# Patient Record
Sex: MALE | Race: WHITE
[De-identification: names, ages, dates, MRNs, and addresses within clinical notes are randomized per-mention and may not be internally consistent; named-entity substitution may affect disease eponyms.]

---

## 2017-10-10 ENCOUNTER — HOSPITAL ENCOUNTER (INPATIENT)
Dept: HOSPITAL 74 - YASAS | Age: 42
LOS: 7 days | Discharge: HOME | DRG: 772 | End: 2017-10-17
Attending: PSYCHIATRY & NEUROLOGY | Admitting: PSYCHIATRY & NEUROLOGY
Payer: COMMERCIAL

## 2017-10-10 DIAGNOSIS — F14.20: Primary | ICD-10-CM

## 2017-10-10 DIAGNOSIS — F19.24: ICD-10-CM

## 2017-10-10 DIAGNOSIS — F17.210: ICD-10-CM

## 2017-10-10 DIAGNOSIS — Z98.890: ICD-10-CM

## 2017-10-10 DIAGNOSIS — F19.282: ICD-10-CM

## 2017-10-10 LAB
APPEARANCE UR: CLEAR
BILIRUB UR STRIP.AUTO-MCNC: NEGATIVE MG/DL
COLOR UR: YELLOW
KETONES UR QL STRIP: NEGATIVE
NITRITE UR QL STRIP: NEGATIVE
PH UR: 5 [PH] (ref 5–8)
PROT UR QL STRIP: NEGATIVE
PROT UR QL STRIP: NEGATIVE
RBC # UR STRIP: NEGATIVE /UL
UROBILINOGEN UR STRIP-MCNC: NEGATIVE MG/DL (ref 0.2–1)

## 2017-10-10 PROCEDURE — G0008 ADMIN INFLUENZA VIRUS VAC: HCPCS

## 2017-10-10 PROCEDURE — HZ42ZZZ GROUP COUNSELING FOR SUBSTANCE ABUSE TREATMENT, COGNITIVE-BEHAVIORAL: ICD-10-PCS | Performed by: PSYCHIATRY & NEUROLOGY

## 2017-10-10 PROCEDURE — G0009 ADMIN PNEUMOCOCCAL VACCINE: HCPCS

## 2017-10-10 RX ADMIN — Medication SCH: at 22:35

## 2017-10-10 NOTE — HP
Admission [facility]





- Hospitals in [place]


Chief Complaint: 


I AM HERE [facility] FROM COCAINE


Allergies/Adverse Reactions: 


 Allergies











Allergy/AdvReac Type Severity Reaction Status Date / Time


 


No Known Allergies Allergy   Verified 10/10/17 14:44











History of Present Illness: 


THIS 42 YEARS OLD MALE WITH COCAINE DEPENDENCE FOR REHAB


PATIENT LAST TREATMENT IN [facility] 10 DAYS AGO NOT COMPLETED


NO SIGNIFICANT PERIOD OF SOBRIETY


Exam Limitations: No Limitations





- Ebola screening


Have you traveled outside of the country in the last 21 days: No


Have you had contact with anyone from an Ebola affected area: No


Have you been sick,other than usual withdrawal symptoms: No


Do you have a fever: No





- Review of Systems


Constitutional: No Symptoms Reported


EENT: reports: No Symptoms Reported


Respiratory: reports: No Symptoms reported


Cardiac: reports: No Symptoms Reported


GI: reports: No Symptoms Reported


: reports: No Symptoms Reported


Musculoskeletal: reports: No Symptoms Reported


Integumentary: reports: No Symptoms Reported


Neuro: reports: No Symptoms reported


Endocrine: reports: No Symptoms Reported


Hematology: reports: No Symptoms Reported


Psychiatric: reports: No Sypmtoms Reported





Patient History





- Patient Medical History


Hx Anemia: No


Hx Asthma: No


Hx Chronic Obstructive Pulmonary Disease (COPD): No


Hx Cancer: No


Hx Cardiac Disorders: No


Hx Congestive Heart Failure: No


Hx Hypertension: No


Hx Hypercholesterolemia: No


Hx Pacemaker: No


HX Cerebrovascular Accident: No


Hx Seizures: No


Hx Dementia: No


Hx Diabetes: No


Hx Gastrointestinal Disorders: No


Hx Liver Disease: No


Hx Genitourinary Disorders: No


Hx Sexually Transmitted Disorders: No


Hx Renal Disease (ESRD): No


Hx Thyroid Disease: No


Hx Human Immunodeficiency Virus (HIV): No (LAST 2016)


Hx Hepatitis C: No


Hx Depression: No


Hx Suicide Attempt: No


Hx Bipolar Disorder: No


Hx Schizophrenia: No


Other Medical History: NO SUICIDAL,NO HOMICIDAL





- Patient Surgical History


Past Surgical History: Yes


Other Surgical History: REMOVAL OF LIPOMA LEFT SCAPULAR AREA X 2 





- PPD History


Previous Implant?: Yes


Documented Results: Negative w/o proof


PPD to be Administered?: Yes





- Smoking Cessation


Smoking history: Current every day smoker


Have you smoked in the past 12 months: Yes


Hx Chewing Tobacco Use: No


Initiated information on smoking cessation: Yes


'Breaking Loose' booklet given: 10/10/17





- Substance & Tx. History


Hx Alcohol Use: No


Hx Substance Use: Yes


Substance Use Type: Cocaine


Hx Substance Use Treatment: Yes ([facility] 10 DAYS AGO NOT COMPLETED)





- Substances Abused


  ** Cocaine


Route: Inhalation


Frequency: 3-6 times per week


Amount used: $100


Age of first use: 18


Date of Last Use: 10/08/17





Family Disease History





- Family Disease History


Family History: Denies





Admission Physical Exam [facility]





- Vital Signs


Vital Signs: 


 Vital Signs - 24 hr











  10/10/17





  10:03


 


Temperature 98.8 F


 


Pulse Rate 74


 


Respiratory 18





Rate 


 


Blood Pressure 144/90














- Physical


General Appearance: Yes: Within Normal Limits, No Apparent Distress


HEENTM: Yes: Normal ENT Inspection, Normocephalic, TESS


Respiratory: Yes: Lungs Clear, Normal Breath Sounds, No Respiratory Distress


Neck: Yes: Within Normal Limits, Supple, Trachea in good position


Breast: Yes: Within Normal Limits


Cardiology: Yes: Within Normal Limits, Regular Rate, S1, S2


Abdominal: Yes: Within Normal Limits, Non Tender, Soft


Genitourinary: Yes: Within Normal Limits


Back: Yes: Within Normal Limits


Musculoskeletal: Yes: Within Normal Limits, full range of Motion


Extremities: Yes: Within Normal Limits, Normal Range of Motion


Neurological: Yes: CNs II-XII NML intact, Alert, Motor Strength 5/5


Integumentary: Yes: Within Normal Limits


Lymphatic: Yes: Within Normal Limits





- Diagnostic


(1) Cocaine dependence


Current Visit: Yes   Status: Acute   





(2) Nicotine dependence


Current Visit: Yes   Status: Acute   





(3) Status post excision of lipoma


Current Visit: Yes   Status: Acute








Cleared for Admission [facility]





- Detox or Rehab


Claeared for Rehab Admission: Yes





[facility] Breath Alcohol Content


Breath Alcohol Content: 0





Urine Drug Screen





- Results


Drug Screen Negative: No


Urine Drug Screen Results: MARY KATE-Cocaine





Inpatient Rehab Admission





- Initial Determination


Are CD services needed?: Yes


Free of communicable disease: Yes


Not in need of hospitalization: Yes





- Rehab Admission Criteria


Poor recovery environment: Yes


Patient is meeting Inpatient Rehab admission criteria:: Yes

## 2017-10-11 LAB
ALBUMIN SERPL-MCNC: 3.9 G/DL (ref 3.4–5)
ALP SERPL-CCNC: 114 U/L (ref 45–117)
ALT SERPL-CCNC: 27 U/L (ref 12–78)
ANION GAP SERPL CALC-SCNC: 7 MMOL/L (ref 8–16)
AST SERPL-CCNC: 19 U/L (ref 15–37)
BILIRUB SERPL-MCNC: 0.4 MG/DL (ref 0.2–1)
CALCIUM SERPL-MCNC: 9 MG/DL (ref 8.5–10.1)
CO2 SERPL-SCNC: 29 MMOL/L (ref 21–32)
CREAT SERPL-MCNC: 1.1 MG/DL (ref 0.7–1.3)
DEPRECATED RDW RBC AUTO: 14 % (ref 11.9–15.9)
GLUCOSE SERPL-MCNC: 83 MG/DL (ref 74–106)
HIV 1 & 2 AB: NEGATIVE
HIV 1 AGP24: NEGATIVE
MCH RBC QN AUTO: 29 PG (ref 25.7–33.7)
MCHC RBC AUTO-ENTMCNC: 33 G/DL (ref 32–35.9)
MCV RBC: 87.6 FL (ref 80–96)
PLATELET # BLD AUTO: 264 K/MM3 (ref 134–434)
PMV BLD: 9.6 FL (ref 7.5–11.1)
PROT SERPL-MCNC: 7.4 G/DL (ref 6.4–8.2)
SP GR UR: >= 1.03 (ref 1–1.02)
WBC # BLD AUTO: 6.5 K/MM3 (ref 4–10)

## 2017-10-11 RX ADMIN — Medication SCH: at 10:27

## 2017-10-11 RX ADMIN — IBUPROFEN PRN MG: 400 TABLET, FILM COATED ORAL at 19:05

## 2017-10-11 RX ADMIN — NICOTINE SCH: 21 PATCH TRANSDERMAL at 10:27

## 2017-10-11 RX ADMIN — Medication SCH: at 23:05

## 2017-10-11 NOTE — HP
Psychiatrist Admission





- Data


Date of interview: 10/11/17


Admission source: Friend


Identifying data: This is the first [facility] 

admission for this 42 years old   male, father of 2 children, 

unemployed with no source of income, domiciled living with his wife and children


Medical History: Significant for history of kidney stones and surgery for 

excision of lipoma on left scapular area. Smokes cigarettes


Psychiatric History: Denies history of previous psychiatric treatment


Physical/Sexual Abuse/Trauma History: Denies history of verbal physical or 

sexual as well as DV relationship. No  service


Additional Comment: Reports history of 6 previous arrests including 3 felony 

convictions. Denies being on parole/probation at present


Vital Signs: 


 Vital Signs - 24 hr











  10/10/17 10/11/17 10/11/17





  10:03 00:30 03:30


 


Temperature 98.8 F  


 


Pulse Rate 74  


 


Respiratory 18 18 18





Rate   


 


Blood Pressure 144/90  











Allergies/Adverse Reactions: 


 Allergies











Allergy/AdvReac Type Severity Reaction Status Date / Time


 


No Known Allergies Allergy   Verified 10/10/17 14:44











Date of last physical exam: 10/10/17


Concur with the findings of this exam: Yes





- Substance Abuse/Tx History


Hx Alcohol Use: No


Hx Substance Use: Yes


Substance Use Type: Cocaine (Started using cocaine at age 18, consumes $100 

worth 3-6 times weekly. Last used on 10/8/17)


Hx Substance Use Treatment: Yes (3-4 prvious inpt rehab)





Mental Status Exam





- Mental Status Exam


Alert and Oriented to: Time, Place, Person


Cognitive Function: Fair


Patient Appearance: Well Groomed


Mood: Irritable


Affect: Appropriate


Speech Pattern: Clear


Voice Loudness: Normal


Thought Process: Intact


Hallucinations: Denies


Suicidal Ideation: Denies


Homicidal Ideation: Denies


Insight/Judgement: Fair


Sleep: Poorly


Appetite: Good


Muscle strength/Tone: Normal


Gait/Station: Normal





Psychiatric Findings





- Problem List (Axis 1, 2,3)


(1) Cocaine dependence


Current Visit: Yes   Status: Acute   





(2) Nicotine dependence


Current Visit: Yes   Status: Acute   





(3) Substance induced mood disorder


Current Visit: Yes   Status: Acute





(4) Substance-induced sleep disorder


Current Visit: Yes   Status: Acute





(5) Status post excision of lipoma


Current Visit: Yes   Status: Acute








- Initial Treatment Plan


Initial Treatment Plan: 1) Start Belsomra 10 mg po HS prn for insomnia.  2) 

Monitor progress

## 2017-10-11 NOTE — EKG
Test Reason : 

Blood Pressure : ***/*** mmHG

Vent. Rate : 076 BPM     Atrial Rate : 076 BPM

   P-R Int : 148 ms          QRS Dur : 098 ms

    QT Int : 406 ms       P-R-T Axes : 056 015 031 degrees

   QTc Int : 456 ms

 

NORMAL SINUS RHYTHM

RSR' OR QR PATTERN IN V1 SUGGESTS RIGHT VENTRICULAR CONDUCTION DELAY

BORDERLINE ECG

NO PREVIOUS ECGS AVAILABLE

Confirmed by BENTLEY CLEVELAND MD ([id number]) on 10/11/2017 10:43:26 AM

 

Referred By:             Confirmed By:BENTLEY CLEVELAND MD

## 2017-10-12 RX ADMIN — Medication SCH: at 10:45

## 2017-10-12 RX ADMIN — Medication SCH MG: at 21:50

## 2017-10-12 RX ADMIN — NICOTINE SCH: 21 PATCH TRANSDERMAL at 10:45

## 2017-10-13 RX ADMIN — Medication SCH MG: at 21:31

## 2017-10-13 RX ADMIN — NICOTINE SCH: 21 PATCH TRANSDERMAL at 10:21

## 2017-10-13 RX ADMIN — Medication SCH: at 10:21

## 2017-10-14 RX ADMIN — Medication SCH TAB: at 09:56

## 2017-10-14 RX ADMIN — NICOTINE SCH: 21 PATCH TRANSDERMAL at 09:57

## 2017-10-14 RX ADMIN — Medication SCH MG: at 21:43

## 2017-10-15 RX ADMIN — Medication SCH MG: at 22:03

## 2017-10-15 RX ADMIN — Medication SCH: at 10:23

## 2017-10-15 RX ADMIN — NICOTINE SCH: 21 PATCH TRANSDERMAL at 10:22

## 2017-10-16 RX ADMIN — Medication SCH: at 23:43

## 2017-10-16 RX ADMIN — IBUPROFEN PRN MG: 400 TABLET, FILM COATED ORAL at 19:42

## 2017-10-16 RX ADMIN — Medication SCH TAB: at 09:59

## 2017-10-16 RX ADMIN — NICOTINE SCH: 21 PATCH TRANSDERMAL at 10:00

## 2017-10-16 NOTE — PN
Psychiatric Progress Note


Vital Signs: 


 Vital Signs











 Period  Temp  Pulse  Resp  BP Sys/Thomason  Pulse Ox


 


 Last 24 Hr  97.3 F  66  16-18  141/80  











Date of Session: 10/16/17


Chief Complaint:: Discharge Note


HPI: Patient addressing Cocaine Dependence comorbid with Nicotine Dependence, 

Substance-Induced Mood Disorder and Substance-Induced Sleep Disorder


Current Medications: 


Active Medications











Generic Name Dose Route Start Last Admin





  Trade Name Freq  PRN Reason Stop Dose Admin


 


Acetaminophen  650 mg 10/10/17 16:18 10/12/17 19:56





  Tylenol -  PO   650 mg





  Q4H PRN   Administration





  PAIN   


 


Al Hydroxide/Mg Hydroxide  30 ml 10/10/17 16:18  





  Mylanta Oral Suspension -  PO   





  Q6H PRN   





  DYSPEPSIA   


 


Diphenhydramine HCl  50 mg 10/13/17 20:00 10/13/17 21:31





  Benadryl -  PO   50 mg





  HS PRN   Administration





  INSOMNIA   


 


Eucalyptus/Menthol/Phenol/Sorbitol  1 each 10/10/17 16:18  





  Cepastat Lozenge -  MM   





  Q4H PRN   





  SORE THROAT   


 


Guaifenesin  10 ml 10/10/17 16:18  





  Robitussin Dm -  PO   





  Q6H PRN   





  COUGH   


 


Hydroxyzine Pamoate  50 mg 10/10/17 16:18 10/14/17 21:44





  Vistaril -  PO   50 mg





  Q4H PRN   Administration





  AGITATION   


 


Ibuprofen  400 mg 10/10/17 16:18 10/11/17 19:05





  Motrin -  PO   400 mg





  Q6H PRN   Administration





  SEVERE PAIN   


 


Loperamide HCl  4 mg 10/10/17 16:18  





  Imodium -  PO   





  Q6H PRN   





  DIARRHEA   


 


Magnesium Citrate  300 ml 10/10/17 16:18  





  Citroma -  PO   





  Q48H PRN   





  CONSTIPATION   


 


Magnesium Hydroxide  30 ml 10/10/17 16:18  





  Milk Of Magnesia -  PO   





  DAILY PRN   





  CONSTIPATION   


 


Nicotine  21 mg 10/11/17 10:00 10/16/17 10:00





  Nicoderm Patch -  TD   Not Given





  DAILY JEIMY   


 


Prenatal Multivit/Folic Acid/Iron  1 tab 10/11/17 10:00 10/16/17 09:59





  Prenatal Vitamins (Sjr) -  PO   1 tab





  DAILY JEIMY   Administration


 


Pseudoephedrine/Triprolidine  1 combo 10/10/17 16:18  





  Actifed -  PO   





  TID PRN   





  NASAL CONGESTION   


 


Thiamine HCl  100 mg 10/10/17 22:00 10/15/17 22:03





  Vitamin B1 -  PO   100 mg





  HS JEIMY   Administration











Current Side Effect: No


Lab tests ordered: Yes


Lab tests reviewed: Yes


Provider note:: Patient will complete this program on 10/17/17. He has met his 

short term goals and will cintinue to address his issues in outpatient 

treatment at [facility] at [address] [place] [address]. Told 

writer that from his participation in this program, he has learned to be focus 

on himself and take things a day at a time. He is stable for discharge on 10/17/

17


Total face to face time:: 35





Mental Status Exam





- Mental Status Exam


Alert and Oriented to: Time, Place, Person


Cognitive Function: Fair


Patient Appearance: Well Groomed


Mood: Hopeful, Euthymic


Affect: Appropriate


Patient Behavior: Cooperative


Speech Pattern: Clear


Voice Loudness: Normal


Thought Process: Intact, Goal Oriented


Thought Disorder: Not Present


Hallucinations: Denies


Suicidal Ideation: Denies


Homicidal Ideation: Denies


Insight/Judgement: Fair


Sleep: Fair


Appetite: Good


Muscle strength/Tone: Normal


Gait/Station: Normal





Psychiatric Treatment Plan





- Problem List


(1) Cocaine dependence


Current Visit: Yes   





(2) Nicotine dependence


Current Visit: Yes   





(3) Substance induced mood disorder


Current Visit: Yes





(4) Substance-induced sleep disorder


Current Visit: Yes





(5) Status post excision of lipoma


Current Visit: Yes


Initial treatment plan: Patient is discharged tomorrow and referred to [facility] for outpatient treatment

## 2017-10-17 VITALS — SYSTOLIC BLOOD PRESSURE: 139 MMHG | DIASTOLIC BLOOD PRESSURE: 90 MMHG | HEART RATE: 70 BPM | TEMPERATURE: 97.8 F

## 2020-01-12 ENCOUNTER — HOSPITAL ENCOUNTER (INPATIENT)
Dept: HOSPITAL 74 - YASAS | Age: 45
LOS: 2 days | Discharge: LEFT BEFORE DISCHARGE | DRG: 770 | End: 2020-01-14
Attending: ALLERGY & IMMUNOLOGY | Admitting: ALLERGY & IMMUNOLOGY
Payer: SELF-PAY

## 2020-01-12 VITALS — BODY MASS INDEX: 30.8 KG/M2

## 2020-01-12 DIAGNOSIS — F10.230: Primary | ICD-10-CM

## 2020-01-12 DIAGNOSIS — I10: ICD-10-CM

## 2020-01-12 DIAGNOSIS — F17.210: ICD-10-CM

## 2020-01-12 DIAGNOSIS — F14.20: ICD-10-CM

## 2020-01-12 PROCEDURE — HZ2ZZZZ DETOXIFICATION SERVICES FOR SUBSTANCE ABUSE TREATMENT: ICD-10-PCS | Performed by: ALLERGY & IMMUNOLOGY

## 2020-01-12 RX ADMIN — Medication PRN MG: at 22:06

## 2020-01-12 RX ADMIN — Medication SCH MG: at 22:06

## 2020-01-12 RX ADMIN — Medication SCH TAB: at 11:17

## 2020-01-12 RX ADMIN — NICOTINE SCH: 14 PATCH, EXTENDED RELEASE TRANSDERMAL at 11:24

## 2020-01-12 RX ADMIN — AMLODIPINE BESYLATE SCH MG: 10 TABLET ORAL at 11:17

## 2020-01-12 NOTE — HP
CIWA Score


Nausea/Vomitin


Muscle Tremors: 3


Anxiety: 0-No Anxiety, at Ease


Agitation: 0-Normal Activity


Paroxysmal Sweats: 2


Orientation: 1-Uncertain about Date


Tacttile Disturbances: 1-Very Mild Itch/Numbness


Auditory Disturbances: 0-None


Visual Disturbances: 2-Mild Sensitivity


Headache: 2-Mild


CIWA-Ar Total Score: 13





- Admission Criteria


[facility] Guidelines: Admission for Medically Managed Detox: 


Requires at least one of the followin. CIWA greater than 12


2. Seizures within the past 24 hours


3. Delirium tremens within the past 24 hours


4. Hallucinations within the past 24 hours


5. Acute intervention needed for co  occurring medical disorder


6. Acute intervention needed for co  occurring psychiatric disorder


7. Severe withdrawal that cannot be handled at a lower level of care (continued


    vomiting, continued diarrhea, abnormal vital signs) requiring intravenous


    medication and/or fluids


8. Pregnancy





Patient presents the following: CIWA greater than 12


Admission Criteria Met: Admission criteria met





Admitting History and Physical





- Smoking History


Smoking history: Former smoker


Have you smoked in the past 12 months: No





- Alcohol/Substance Use


Hx Alcohol Use: No





Admission ROS S





- HPI


Chief Complaint: 





Withdrawal sx


Allergies/Adverse Reactions: 


 Allergies











Allergy/AdvReac Type Severity Reaction Status Date / Time


 


No Known Allergies Allergy   Verified 20 08:37











History of Present Illness: 





44 year old man presents seeking detox from alcohol. He denies alcohol related 

seizures or blackouts


Exam Limitations: No Limitations





- Ebola screening


Have you traveled outside of the country in the last 21 days: No (N)


Have you had contact with anyone from an Ebola affected area: No


Have you been sick,other than usual withdrawal symptoms: No


Do you have a fever: No





- Review of Systems


Constitutional: Chills, Changes in sleep


EENT: reports: Recent change in vision


Cardiac: reports: Lightheadedness


GI: reports: Nausea, Poor Appetite, Poor Fluid Intake, Abdominal cramping


: reports: No Symptoms Reported


Musculoskeletal: reports: Back Pain, Joint Pain, Muscle Pain, Muscle Weakness


Integumentary: reports: Sweating


Neuro: reports: Headache, Numbness, Tremors


Endocrine: reports: No Symptoms Reported


Hematology: reports: No Symptoms Reported


Psychiatric: reports: No Sypmtoms Reported


Other Systems: Reviewed and Negative





Patient History





- Patient Medical History


Hx Anemia: No


Hx Asthma: No


Hx Chronic Obstructive Pulmonary Disease (COPD): No


Hx Cancer: No


Hx Cardiac Disorders: No


Hx Congestive Heart Failure: No


Hx Hypertension: Yes


Hx Hypercholesterolemia: No


Hx Pacemaker: No


HX Cerebrovascular Accident: No


Hx Seizures: No


Hx Dementia: No


Hx Diabetes: No


Hx Gastrointestinal Disorders: No


Hx Liver Disease: No


Hx Genitourinary Disorders: No


Hx Sexually Transmitted Disorders: No


Hx Renal Disease (ESRD): No


Hx Thyroid Disease: No


Hx Human Immunodeficiency Virus (HIV): No


Hx Hepatitis C: No


Hx Depression: No


Hx Suicide Attempt: No


Hx Bipolar Disorder: No


Hx Schizophrenia: No





- Patient Surgical History


Past Surgical History: Yes


Hx Neurologic Surgery: No


Hx Cataract Extraction: No


Hx Cardiac Surgery: No


Hx Lung Surgery: No


Hx Breast Surgery: No


Hx Breast Biopsy: No


Hx Abdominal Surgery: No


Hx Appendectomy: No


Hx Cholecystectomy: No


Hx Genitourinary Surgery: No


Hx  Section: No


Hx Orthopedic Surgery: No


Other Surgical History: REMOVAL OF LIPOMA LEFT SCAPULAR AREA X 2 


Anesthesia Reaction: No





- PPD History


Previous Implant?: Yes


Documented Results: Negative w/proof


Implanted On Prior [facility] Admission?: Yes


Date: 10/12/17


PPD to be Administered?: Yes





- Smoking Cessation


Smoking history: Current every day smoker


Have you smoked in the past 12 months: Yes


Aproximately how many cigarettes per day: 20


Hx Chewing Tobacco Use: No


Initiated information on smoking cessation: Yes


'Breaking Loose' booklet given: 20





- Substances abused


  ** Alcohol


Substance route: Oral


Frequency: Daily


Amount used: 1 pint of liquor


Age of first use: 13


Date of last use: 18





  ** Cocaine


Substance route: Smoking


Frequency: Daily


Amount used: $100


Age of first use: 17


Date of last use: 20





Admission Physical Exam [facility]





- Vital Signs


Vital Signs: 


 Vital Signs - 24 hr











  20





  08:38


 


Temperature 99.1 F


 


Pulse Rate 106 H


 


Respiratory 18





Rate 


 


Blood Pressure 142/85














- Physical


General Appearance: Yes: No Apparent Distress, Other (sleepy)


HEENTM: Yes: Normocephalic, Normal Voice


Respiratory: Yes: Chest Non-Tender, Lungs Clear, Normal Breath Sounds, No 

Respiratory Distress, No Accessory Muscle Use


Neck: Yes: No masses,lesions,Nodules, Supple


Breast: Yes: Breast Exam Deferred


Cardiology: Yes: Regular Rhythm, Regular Rate, S1, S2


Abdominal: Yes: Normal Bowel Sounds, Non Tender, Soft


Genitourinary: Yes: Within Normal Limits


Back: Yes: Normal Inspection


Musculoskeletal: Yes: Pelvis Stable, Back pain, Muscle Pain, Muscle weakness


Extremities: Yes: Tremors


Neurological: Yes: CNs II-XII NML intact, Alert (but sleepy)


Integumentary: Yes: Cold, Moist


Lymphatic: Yes: Within Normal Limits





- Diagnostic


(1) Alcohol dependence, uncomplicated


Current Visit: Yes   Status: Acute   





(2) Cocaine dependence


Current Visit: Yes   Status: Acute   


Qualifiers: 


   Substance use status: uncomplicated   Qualified Code(s): F14.20 - Cocaine 

dependence, uncomplicated   





(3) Nicotine dependence


Current Visit: Yes   Status: Acute   


Qualifiers: 


   Nicotine product type: cigarettes   Substance use status: uncomplicated   

Qualified Code(s): F17.210 - Nicotine dependence, cigarettes, uncomplicated   





Cleared for Admission [facility]





- Detox or Rehab


[facility] Level of Care: Medically Managed


Detox Regimen/Protocol: Librium


Claeared for Rehab Admission: No





Breathalyzer





- Breathalyzer


Breathalyzer: 0





Urine Drug Screen





- Test Device


Lot number: [id number]


Expiration date: 21





- Control


Is test valid?: Yes





- Results


Drug screen NEGATIVE: No


Urine drug screen results: MARY KATE-Cocaine





Inpatient Rehab Admission





- Rehab Decision to Admit


Inpatient rehab admission?: No

## 2020-01-13 LAB
ALBUMIN SERPL-MCNC: 3.2 G/DL (ref 3.4–5)
ALP SERPL-CCNC: 109 U/L (ref 45–117)
ALT SERPL-CCNC: 29 U/L (ref 13–61)
ANION GAP SERPL CALC-SCNC: 4 MMOL/L (ref 8–16)
AST SERPL-CCNC: 18 U/L (ref 15–37)
BILIRUB SERPL-MCNC: 0.2 MG/DL (ref 0.2–1)
BUN SERPL-MCNC: 17.2 MG/DL (ref 7–18)
CALCIUM SERPL-MCNC: 8.8 MG/DL (ref 8.5–10.1)
CHLORIDE SERPL-SCNC: 106 MMOL/L (ref 98–107)
CO2 SERPL-SCNC: 30 MMOL/L (ref 21–32)
CREAT SERPL-MCNC: 1 MG/DL (ref 0.55–1.3)
DEPRECATED RDW RBC AUTO: 14.5 % (ref 11.9–15.9)
GLUCOSE SERPL-MCNC: 103 MG/DL (ref 74–106)
HCT VFR BLD CALC: 42.7 % (ref 35.4–49)
HGB BLD-MCNC: 14.3 GM/DL (ref 11.7–16.9)
MCH RBC QN AUTO: 29.6 PG (ref 25.7–33.7)
MCHC RBC AUTO-ENTMCNC: 33.6 G/DL (ref 32–35.9)
MCV RBC: 88.2 FL (ref 80–96)
PLATELET # BLD AUTO: 317 K/MM3 (ref 134–434)
PMV BLD: 8.9 FL (ref 7.5–11.1)
POTASSIUM SERPLBLD-SCNC: 4.3 MMOL/L (ref 3.5–5.1)
PROT SERPL-MCNC: 6.7 G/DL (ref 6.4–8.2)
RBC # BLD AUTO: 4.84 M/MM3 (ref 4–5.6)
SODIUM SERPL-SCNC: 139 MMOL/L (ref 136–145)
WBC # BLD AUTO: 7.2 K/MM3 (ref 4–10)

## 2020-01-13 RX ADMIN — Medication PRN MG: at 22:21

## 2020-01-13 RX ADMIN — Medication SCH MG: at 22:21

## 2020-01-13 RX ADMIN — NICOTINE SCH: 14 PATCH, EXTENDED RELEASE TRANSDERMAL at 10:32

## 2020-01-13 RX ADMIN — Medication SCH TAB: at 10:32

## 2020-01-13 RX ADMIN — AMLODIPINE BESYLATE SCH MG: 10 TABLET ORAL at 10:32

## 2020-01-13 NOTE — PN
S CIWA





- CIWA Score


Nausea/Vomitin-Mild Nausea/No Vomiting


Muscle Tremors: 3


Anxiety: 3


Agitation: 3


Paroxysmal Sweats: 2


Orientation: 0-Oriented


Tacttile Disturbances: 0-None


Auditory Disturbances: 0-None


Visual Disturbances: 0-None


Headache: 0-None Present


CIWA-Ar Total Score: 12





[facility] Progress Note (SOAP)


Subjective: 





44 years old male admitted on 20 for alcohol withdrawal sx management 

treating with librium detox regimen ate breakfast resting on bed encourage to 

attend behavior and psychosocial therapies while in detox


Objective: 





20 11:21


 Vital Signs











Temperature  97.1 F L  20 10:35


 


Pulse Rate  100 H  20 10:35


 


Respiratory Rate  18   20 10:35


 


Blood Pressure  149/96   20 10:35


 


O2 Sat by Pulse Oximetry (%)      











20 11:21


lab pending


Assessment: 





20 11:23


alcohol withdrawal 


Plan: 





librium regimen

## 2020-01-14 VITALS — TEMPERATURE: 97.7 F | DIASTOLIC BLOOD PRESSURE: 103 MMHG | SYSTOLIC BLOOD PRESSURE: 152 MMHG | HEART RATE: 94 BPM

## 2020-01-14 RX ADMIN — NICOTINE SCH: 14 PATCH, EXTENDED RELEASE TRANSDERMAL at 10:50

## 2020-01-14 RX ADMIN — Medication SCH: at 10:50

## 2020-01-14 RX ADMIN — AMLODIPINE BESYLATE SCH MG: 10 TABLET ORAL at 10:49

## 2020-01-14 NOTE — PN
[facility] Progress Note


Note: 





Was notified by the nurse that the patient wished to leave AMA. On interview, 

the patient states that he is concerned that his father is not answering the 

phone. In addition, he also states that he feels better and wishes to leave. 





Informed the patient that leaving the facility before he is finished with his 

detox would mean that he would need to leave AMA. Explained to the patient the 

risks of leaving AMA including worsening of his withdrawal, relapse, risk of 

overdose and possible death. Patient verbalized understanding and still 

expressed wishes to leave. Patient A&Ox3 throughout the interview.

## 2020-01-14 NOTE — PN
[facility] CIWA





- CIWA Score


Nausea/Vomitin-Mild Nausea/No Vomiting


Muscle Tremors: 2


Anxiety: 2


Agitation: 2


Paroxysmal Sweats: No Perspiration


Orientation: 0-Oriented


Tacttile Disturbances: 1-Very Mild Itch/Numbness


Auditory Disturbances: 0-None


Visual Disturbances: 0-None


Headache: 1-Very Mild


CIWA-Ar Total Score: 9





[facility] Progress Note (SOAP)


Subjective: 


alert,irritable,anxious,interrupted sleep,pain in the body





Objective: 





20 10:34


 Vital Signs











Temperature  98.1 F   20 05:00


 


Pulse Rate  82   20 05:00


 


Respiratory Rate  20   20 05:00


 


Blood Pressure  154/85   20 05:00


 


O2 Sat by Pulse Oximetry (%)      











20 10:34


 Laboratory Last Values











WBC  7.2 K/mm3 (4.0-10.0)   20  08:12    


 


RBC  4.84 M/mm3 (4.00-5.60)   20  08:12    


 


Hgb  14.3 GM/dL (11.7-16.9)   20  08:12    


 


Hct  42.7 % (35.4-49)   20  08:12    


 


MCV  88.2 fl (80-96)   20  08:12    


 


MCH  29.6 pg (25.7-33.7)   20  08:12    


 


MCHC  33.6 g/dl (32.0-35.9)   20  08:12    


 


RDW  14.5 % (11.9-15.9)   20  08:12    


 


Plt Count  317 K/MM3 (134-434)  D 20  08:12    


 


MPV  8.9 fl (7.5-11.1)   20  08:12    


 


Sodium  139 mmol/L (136-145)   20  08:12    


 


Potassium  4.3 mmol/L (3.5-5.1)   20  08:12    


 


Chloride  106 mmol/L ()   20  08:12    


 


Carbon Dioxide  30 mmol/L (21-32)   20  08:12    


 


Anion Gap  4 MMOL/L (8-16)  L  20  08:12    


 


BUN  17.2 mg/dL (7-18)   20  08:12    


 


Creatinine  1.0 mg/dL (0.55-1.3)   20  08:12    


 


Est GFR (CKD-EPI)AfAm  104.15   20  08:12    


 


Est GFR (CKD-EPI)NonAf  89.86   20  08:12    


 


Random Glucose  103 mg/dL ()   20  08:12    


 


Calcium  8.8 mg/dL (8.5-10.1)   20  08:12    


 


Total Bilirubin  0.2 mg/dL (0.2-1)   20  08:12    


 


AST  18 U/L (15-37)   20  08:12    


 


ALT  29 U/L (13-61)   20  08:12    


 


Alkaline Phosphatase  109 U/L ()   20  08:12    


 


Total Protein  6.7 g/dl (6.4-8.2)   20  08:12    


 


Albumin  3.2 g/dl (3.4-5.0)  L  20  08:12    


 


RPR Titer  Nonreactive  (NONREACTIVE)   20  08:12    











Assessment: 





20 10:35


withdrawal symptom


Plan: 





continue detox librium regimen